# Patient Record
Sex: MALE | Race: BLACK OR AFRICAN AMERICAN | NOT HISPANIC OR LATINO | Employment: FULL TIME | URBAN - METROPOLITAN AREA
[De-identification: names, ages, dates, MRNs, and addresses within clinical notes are randomized per-mention and may not be internally consistent; named-entity substitution may affect disease eponyms.]

---

## 2017-05-26 ENCOUNTER — OFFICE VISIT (OUTPATIENT)
Dept: BARIATRICS/WEIGHT MGMT | Facility: CLINIC | Age: 37
End: 2017-05-26

## 2017-05-26 VITALS
HEART RATE: 68 BPM | HEIGHT: 70 IN | TEMPERATURE: 98.9 F | WEIGHT: 245 LBS | SYSTOLIC BLOOD PRESSURE: 164 MMHG | BODY MASS INDEX: 35.07 KG/M2 | RESPIRATION RATE: 18 BRPM | DIASTOLIC BLOOD PRESSURE: 97 MMHG

## 2017-05-26 DIAGNOSIS — IMO0001 REGURGITATION: ICD-10-CM

## 2017-05-26 DIAGNOSIS — E66.9 OBESITY, CLASS II, BMI 35-39.9: Primary | ICD-10-CM

## 2017-05-26 DIAGNOSIS — Z71.3 DIETARY COUNSELING: ICD-10-CM

## 2017-05-26 DIAGNOSIS — I10 ESSENTIAL HYPERTENSION: ICD-10-CM

## 2017-05-26 PROCEDURE — S2083 ADJUSTMENT GASTRIC BAND: HCPCS | Performed by: NURSE PRACTITIONER

## 2017-08-09 ENCOUNTER — OFFICE VISIT (OUTPATIENT)
Dept: BARIATRICS/WEIGHT MGMT | Facility: CLINIC | Age: 37
End: 2017-08-09

## 2017-08-09 VITALS
HEIGHT: 70 IN | SYSTOLIC BLOOD PRESSURE: 144 MMHG | WEIGHT: 245 LBS | HEART RATE: 83 BPM | RESPIRATION RATE: 18 BRPM | DIASTOLIC BLOOD PRESSURE: 84 MMHG | BODY MASS INDEX: 35.07 KG/M2 | TEMPERATURE: 98.5 F

## 2017-08-09 DIAGNOSIS — I10 ESSENTIAL HYPERTENSION: ICD-10-CM

## 2017-08-09 DIAGNOSIS — Z71.3 DIETARY COUNSELING: ICD-10-CM

## 2017-08-09 DIAGNOSIS — E66.9 OBESITY, CLASS II, BMI 35-39.9: Primary | ICD-10-CM

## 2017-08-09 DIAGNOSIS — E78.5 HYPERLIPIDEMIA, UNSPECIFIED HYPERLIPIDEMIA TYPE: ICD-10-CM

## 2017-08-09 DIAGNOSIS — M25.50 MULTIPLE JOINT PAIN: ICD-10-CM

## 2017-08-09 PROBLEM — IMO0001 REGURGITATION: Status: RESOLVED | Noted: 2017-05-26 | Resolved: 2017-08-09

## 2017-08-09 PROCEDURE — 99212 OFFICE O/P EST SF 10 MIN: CPT | Performed by: NURSE PRACTITIONER

## 2017-08-09 RX ORDER — VORTIOXETINE 10 MG/1
TABLET, FILM COATED ORAL
COMMUNITY
Start: 2017-08-04 | End: 2018-03-29

## 2017-08-09 RX ORDER — IRBESARTAN AND HYDROCHLOROTHIAZIDE 300; 12.5 MG/1; MG/1
TABLET, FILM COATED ORAL
COMMUNITY
Start: 2017-08-04

## 2017-08-09 NOTE — PROGRESS NOTES
[facility] BAR[facility]ATR[facility] BARIATRIC SURGERY  [address]  [facility] [address] [phone]  [address]  [facility] [address] [phone]  Dept: [phone]  8/9/2017      Rafiq Galvan.  [id number]  [id number]  1980  male      Chief Complaint   Patient presents with   • Follow-up     s/p lapband       BH Post-Op Bariatric Surgery:   Rafiq Galvan is status post Lapband procedure (APS), performed on 3/28/16    HPI:   Today's weight is 245 lb (111 kg) pounds, today's BMI is Body mass index is 35.15 kg/(m^2). and he has had no change in weight since the last visit. The patient reports experiencing hunger, but decreased hunger and loss of appetite.     Rafiq Galvan denies nausea, dysphagia or abdominal pain. The patientdoes not have vomiting or regurgitation. The patientdoes not have heartburn/reflux.    Patient doing well with his current level of restriction- not having any problems with eating solids and feels like he is full on the small plate portion.    Patient states their portion size is the small plate and their hunger is appropriate.    Diet and Exercise: Diet history reviewed and discussed with the patient. Weight loss/gains to date discussed with the patient. He reports eating 3 meals per day, a typical portion size of 1 cup, eating 2 snack per day, drinking 5 8-oz. glasses of water per day. The patient can tolerate solid protein.   The patient is eating protein first. The patient is limiting food volume. The patient is not taking vitamins. The patient is limiting snacking. The patient is regular exercise- running/walking 4-5 times a week. He is not drinking carbonated beverages.     The following portions of the patient's history were reviewed and updated as appropriate: allergies, current medications, past family history, past medical history, past social history, past surgical history and problem list.    Review of Systems   All other systems  reviewed and are negative.      Vitals:    08/09/17 1353   BP: 144/84   Pulse: 83   Resp: 18   Temp: 98.5 °F (36.9 °C)       Physical Exam   Constitutional: He is oriented to person, place, and time. He appears well-developed and well-nourished.   HENT:   Head: Normocephalic and atraumatic.   Eyes: EOM are normal.   Cardiovascular: Normal rate.    Pulmonary/Chest: Effort normal.   Musculoskeletal: Normal range of motion.   Neurological: He is alert and oriented to person, place, and time.   Skin: Skin is warm and dry.   Psychiatric: He has a normal mood and affect. His behavior is normal. Judgment and thought content normal.   Vitals reviewed.      Assessment: Post-operatively the patient is doing well.     Encounter Diagnoses   Name Primary?   • Obesity, Class II, BMI 35-39.9 Yes   • Dietary counseling    • Essential hypertension    • Hyperlipidemia, unspecified hyperlipidemia type    • Multiple joint pain        Plan:   Patient doing well with the current amount of restriction- not having any problems. Discussed appropriate dietary choices like cutting back on sugar. Continue with exercise.    No adjustment today as patient has ideal level of restriction. RTC for n/v/d/regurg. Encouraged patient to be sure to eat plenty of dense lean protein and high fiber foods like vegetables and fresh fruits while reducing simple carbohydrates. Reviewed the importance of eating solid foods vs. soft which will contribute to weight gain. Discussed the recommended amount of water to intake daily- half of body weight in ounces. Not drinking with or right after meals.    Activity restrictions: None.   Instructions / Recommendations: dietary counseling recommended, recommended a daily protein intake of  grams, patient was advised that the lap band system works best when consuming solid foods, vitamin supplement(s) recommended, recommended exercising at least 150 minutes per week inclduing both cardio and strength  training, behavior modifications recommended and instructed to call the office for concerns, questions, or problems.     The patient was instructed to follow up in 6-8 weeks      The patient was counseled regarding. Total time spent face to face was 12 minutes and 8 minutes was spent counseling.

## 2018-02-12 ENCOUNTER — OFFICE VISIT (OUTPATIENT)
Dept: BARIATRICS/WEIGHT MGMT | Facility: CLINIC | Age: 38
End: 2018-02-12

## 2018-02-12 VITALS
HEIGHT: 70 IN | RESPIRATION RATE: 16 BRPM | DIASTOLIC BLOOD PRESSURE: 95 MMHG | WEIGHT: 255 LBS | BODY MASS INDEX: 36.51 KG/M2 | SYSTOLIC BLOOD PRESSURE: 166 MMHG | HEART RATE: 85 BPM | TEMPERATURE: 98.4 F

## 2018-02-12 DIAGNOSIS — Z71.3 DIETARY COUNSELING: ICD-10-CM

## 2018-02-12 DIAGNOSIS — E66.9 OBESITY, CLASS II, BMI 35-39.9: Primary | ICD-10-CM

## 2018-02-12 DIAGNOSIS — M25.50 MULTIPLE JOINT PAIN: ICD-10-CM

## 2018-02-12 DIAGNOSIS — F41.8 DEPRESSION WITH ANXIETY: ICD-10-CM

## 2018-02-12 PROCEDURE — S2083 ADJUSTMENT GASTRIC BAND: HCPCS | Performed by: NURSE PRACTITIONER

## 2018-02-12 NOTE — PROGRESS NOTES
[facility]IATRIC [facility] BARIATRIC SURGERY  [address]  [facility] [address] [phone]  [address]  [facility] [address] [phone]  Dept: [phone]  2/12/2018      Rafiq Galvan.  [id number]  [id number]  1980  male      Chief Complaint   Patient presents with   • Follow-up     Followup AGB (4.5 mL)       BH Post-Op Bariatric Surgery:   Rafiq Galvan is status post Lapband procedure (APS), performed on 3/28/16.     HPI:   Today's weight is 116 kg (255 lb)  pounds, today's BMI is Body mass index is 36.59 kg/(m^2). and he has a gain of 10 pounds since the last visit. The patient reports a portion size larger than the small plate, increased hunger and denies a loss of appetite.     Rafiq Galvan denies nausea, dysphagia, or abdominal pain. The patientdoes have vomitng. The patientdoes have reflux.    Diet and Exercise: Diet history reviewed and discussed with the patient. Weight loss/gains to date discussed with the patient. He reports eating 3 meals per day, a typical portion size of greater than 1 cup, eating 2 snack per day, drinking 8 8-oz. glasses of water per day. The patient can tolerate solid protein.   The patient is eating protein first. The patient is limiting food volume. The patient is taking vitamins. The patient is limiting snacking. The patient is exercising regularly- bootcamp 4 days per week. He is drinking carbonated beverages.     The following portions of the patient's history were reviewed and updated as appropriate: allergies, current medications, past family history, past medical history, past social history, past surgical history and problem list.    Vitals:    02/12/18 1518   BP: 166/95   Pulse: 85   Resp: 16   Temp: 98.4 °F (36.9 °C)       Review of Systems   Constitutional: Positive for appetite change. Negative for fatigue and unexpected weight change.   HENT: Negative.    Eyes: Negative.    Respiratory: Negative.     Cardiovascular: Negative.  Negative for leg swelling.   Gastrointestinal: Negative for abdominal distention, abdominal pain, constipation, diarrhea, nausea and vomiting.   Genitourinary: Negative for difficulty urinating, frequency and urgency.   Musculoskeletal: Negative for back pain.   Skin: Negative.    Psychiatric/Behavioral: Negative.    All other systems reviewed and are negative.      Physical Exam   Constitutional: He appears well-developed and well-nourished.   Neck: No thyromegaly present.   Cardiovascular: Normal rate, regular rhythm and normal heart sounds.    Pulmonary/Chest: Effort normal and breath sounds normal. No respiratory distress. He has no wheezes.   Abdominal: Soft. Bowel sounds are normal. He exhibits no distension. There is no tenderness. There is no guarding. No hernia.   Musculoskeletal: He exhibits no edema or tenderness.   Neurological: He is alert.   Skin: Skin is warm and dry. No rash noted. No erythema.   Psychiatric: He has a normal mood and affect. His behavior is normal.   Nursing note and vitals reviewed.        Assessment: Post-operatively the patient is doing well    Encounter Diagnoses   Name Primary?   • Obesity, Class II, BMI 35-39.9 Yes   • Multiple joint pain    • Depression with anxiety    • Dietary counseling        Plan:     I think he would benefit from additional band restriction.  Under aseptic conditions, I accessed the port and 0.6mls of fluid were added for a total of 6.1mls.  He was able to tolerate a glass of water at the conclusion of the fill.  He was advised to consume soft solids for 24 hours before advancing back to a regular diet.  RTC for n/v/d/regurg.     We discussed his protein sources and that eating dense solid protein along with plenty of vegetables and fresh fruits is important for weight loss. Reviewed appropriate water intake- half of body weight in ounces and exercise routine- minimum of 150 minutes per with including both cardio and strength  training. Instructed not to drink with meals and wait 45 minutes after each meal before drinking.    He should follow-up in 4-6 weeks       Activity restrictions: None.   Instructions / Recommendations: dietary counseling recommended, recommended a daily protein intake of  grams, patient was advised that the lap band system works best when consuming solid foods, vitamin supplement(s) recommended, recommended exercising at least 150 minutes per week, behavior modifications recommended and instructed to call the office for concerns, questions, or problems.

## 2018-02-21 ENCOUNTER — HOSPITAL ENCOUNTER (OUTPATIENT)
Dept: GENERAL RADIOLOGY | Facility: HOSPITAL | Age: 38
Discharge: HOME | End: 2018-02-21
Admitting: FAMILY MEDICINE

## 2018-02-21 DIAGNOSIS — R52 PAIN: ICD-10-CM

## 2018-02-21 PROCEDURE — 73560 X-RAY EXAM OF KNEE 1 OR 2: CPT

## 2018-03-29 ENCOUNTER — OFFICE VISIT (OUTPATIENT)
Dept: BARIATRICS/WEIGHT MGMT | Facility: CLINIC | Age: 38
End: 2018-03-29

## 2018-03-29 VITALS
SYSTOLIC BLOOD PRESSURE: 156 MMHG | DIASTOLIC BLOOD PRESSURE: 96 MMHG | TEMPERATURE: 98.5 F | BODY MASS INDEX: 37.22 KG/M2 | RESPIRATION RATE: 18 BRPM | HEART RATE: 70 BPM | HEIGHT: 70 IN | WEIGHT: 260 LBS

## 2018-03-29 DIAGNOSIS — E78.5 HYPERLIPIDEMIA, UNSPECIFIED HYPERLIPIDEMIA TYPE: ICD-10-CM

## 2018-03-29 DIAGNOSIS — M25.50 MULTIPLE JOINT PAIN: ICD-10-CM

## 2018-03-29 DIAGNOSIS — Z72.4 INAPPROPRIATE DIET AND EATING HABITS: ICD-10-CM

## 2018-03-29 DIAGNOSIS — I10 ESSENTIAL HYPERTENSION: ICD-10-CM

## 2018-03-29 DIAGNOSIS — E66.9 OBESITY, CLASS II, BMI 35-39.9: Primary | ICD-10-CM

## 2018-03-29 DIAGNOSIS — Z71.3 DIETARY COUNSELING: ICD-10-CM

## 2018-03-29 DIAGNOSIS — R63.5 UNINTENDED WEIGHT GAIN: ICD-10-CM

## 2018-03-29 PROCEDURE — S2083 ADJUSTMENT GASTRIC BAND: HCPCS | Performed by: NURSE PRACTITIONER

## 2018-03-29 NOTE — PROGRESS NOTES
[facility] BARIATRIC [facility] BARIATRIC SURGERY  [address]  [facility] [address] [phone]  [address]  [facility] [address] [phone]  Dept: [phone]  3/29/2018      Rafiq Galvan.  [id number]  [id number]  1980  male      Chief Complaint   Patient presents with   • Follow-up     Followup AGB (6.1 mL)       BH Post-Op Bariatric Surgery:   Rafiq Galvan is status post Lapband procedure APS, performed on 3/28/16.     HPI:   Today's weight is 118 kg (260 lb)  pounds, today's BMI is Body mass index is 37.31 kg/m². and he has a gain of 5 pounds since the last visit. The patient reports a portion size larger than the small plate, increased hunger and denies a loss of appetite.     Rafiq Galvan denies nausea, dysphagia, or abdominal pain. The patientdoes not have vomitng. The patientdoes not have reflux.    Diet and Exercise: Diet history reviewed and discussed with the patient. Weight loss/gains to date discussed with the patient. He reports eating 3 meals per day, a typical portion size of greater than 1 cup, eating 3 snack per day, drinking 5 8-oz. glasses of water per day. The patient can tolerate solid protein.   The patient is eating protein first. The patient is not limiting food volume. The patient is not taking vitamins. The patient is limiting snacking. The patient is exercising regularly- going to [facility]. He is not drinking carbonated beverages.     The following portions of the patient's history were reviewed and updated as appropriate: allergies, current medications, past family history, past medical history, past social history, past surgical history and problem list.    Vitals:    03/29/18 1517   BP: 156/96   Pulse: 70   Resp: 18   Temp: 98.5 °F (36.9 °C)       Review of Systems   Endocrine: Positive for polyphagia.   All other systems reviewed and are negative.      Physical Exam   Constitutional: He is oriented to person, place, and time. He  appears well-developed and well-nourished.   HENT:   Head: Normocephalic and atraumatic.   Eyes: EOM are normal.   Cardiovascular: Normal rate.    Pulmonary/Chest: Effort normal.   Abdominal: Soft.   Musculoskeletal: Normal range of motion.   Neurological: He is alert and oriented to person, place, and time.   Skin: Skin is warm and dry.   Psychiatric: He has a normal mood and affect. His behavior is normal. Judgment and thought content normal.   Vitals reviewed.        Assessment: Post-operatively the patient is doing well but would benefit from additional restriction    Encounter Diagnoses   Name Primary?   • Obesity, Class II, BMI 35-39.9 Yes   • Dietary counseling    • Inappropriate diet and eating habits    • Unintended weight gain    • Essential hypertension    • Hyperlipidemia, unspecified hyperlipidemia type    • Multiple joint pain        Plan:     I think he would benefit from additional band restriction.  Under aseptic conditions, I accessed the port and 0.5mls of fluid were added for a total of 6.6mls.  He was able to tolerate a glass of water at the conclusion of the fill.  He was advised to consume soft solids for 24 hours before advancing back to a regular diet.  RTC for n/v/d/regurg.     We discussed his protein sources and that eating dense solid protein along with plenty of vegetables and fresh fruits is important for weight loss. Reviewed appropriate water intake- half of body weight in ounces and exercise routine- minimum of 150 minutes per with including both cardio and strength training. Instructed not to drink with meals and wait 45 minutes after each meal before drinking.    He should follow-up in 6 weeks       Activity restrictions: None.   Instructions / Recommendations: dietary counseling recommended, recommended a daily protein intake of  grams, patient was advised that the lap band system works best when consuming solid foods, vitamin supplement(s) recommended, recommended  exercising at least 150 minutes per week, behavior modifications recommended and instructed to call the office for concerns, questions, or problems.

## 2019-02-14 ENCOUNTER — OFFICE VISIT (OUTPATIENT)
Dept: BARIATRICS/WEIGHT MGMT | Facility: CLINIC | Age: 39
End: 2019-02-14

## 2019-02-14 VITALS
HEIGHT: 70 IN | DIASTOLIC BLOOD PRESSURE: 91 MMHG | SYSTOLIC BLOOD PRESSURE: 138 MMHG | WEIGHT: 256 LBS | TEMPERATURE: 98.6 F | RESPIRATION RATE: 18 BRPM | BODY MASS INDEX: 36.65 KG/M2 | HEART RATE: 75 BPM

## 2019-02-14 DIAGNOSIS — E66.9 OBESITY, CLASS II, BMI 35-39.9: Primary | ICD-10-CM

## 2019-02-14 DIAGNOSIS — R63.2 POLYPHAGIA: ICD-10-CM

## 2019-02-14 DIAGNOSIS — Z71.3 DIETARY COUNSELING: ICD-10-CM

## 2019-02-14 PROCEDURE — 99213 OFFICE O/P EST LOW 20 MIN: CPT | Performed by: NURSE PRACTITIONER

## 2019-02-14 NOTE — PROGRESS NOTES
[facility] BARIATRIC [facility] BARIATRIC SURGERY  [address]  [facility] [address] [phone]  [address]  [facility] [address] [phone]  Dept: [phone]  2/14/2019      Rafiq Galvan.  [id number]  [id number]  1980  male      Chief Complaint   Patient presents with   • Follow-up     band follow up       BH Post-Op Bariatric Surgery:   Rafiq Galvan is status post Lapband procedure APS, performed on 3/28/16.     HPI:   Today's weight is 116 kg (256 lb)  pounds, today's BMI is Body mass index is 36.73 kg/m². and he has a loss of 4 pounds since the last visit. The patient reports a portion size larger than the small plate, increased hunger and denies a loss of appetite.     Rafiq Galvan denies nausea, dysphagia, or abdominal pain. The patientdoes not have vomitng. The patientdoes not have reflux.    Diet and Exercise: Diet history reviewed and discussed with the patient. Weight loss/gains to date discussed with the patient. He reports eating 3 meals per day, a typical portion size of greater than 1 cup, eating 5 snack per day, drinking 3 8-oz. glasses of water per day. The patient can tolerate solid protein.   The patient is eating protein first. The patient is not limiting food volume. The patient is not taking vitamins. The patient is not limiting snacking. The patient is exercising regularly- gym/weights 5 times a week. He is not drinking carbonated beverages.     The following portions of the patient's history were reviewed and updated as appropriate: allergies, current medications, past family history, past medical history, past social history, past surgical history and problem list.    Vitals:    02/14/19 1204   BP: 138/91   Pulse: 75   Resp: 18   Temp: 98.6 °F (37 °C)       Review of Systems   Endocrine: Positive for polyphagia.   All other systems reviewed and are negative.      Physical Exam   Constitutional: He is oriented to person, place, and time.  He appears well-developed and well-nourished.   HENT:   Head: Normocephalic and atraumatic.   Eyes: EOM are normal.   Cardiovascular: Normal rate.   Pulmonary/Chest: Effort normal.   Abdominal: Soft.   Musculoskeletal: Normal range of motion.   Neurological: He is alert and oriented to person, place, and time.   Skin: Skin is warm and dry.   Psychiatric: He has a normal mood and affect. His behavior is normal. Judgment and thought content normal.   Vitals reviewed.      Assessment: Post-operatively the patient would benefit from additional restriction    Encounter Diagnoses   Name Primary?   • Obesity, Class II, BMI 35-39.9 Yes   • Dietary counseling    • Polyphagia        Plan:     I think he would benefit from additional band restriction.  Under aseptic conditions, I accessed the port and 0.6mls of fluid were added for a total of 6.2mls.  He was able to tolerate a glass of water at the conclusion of the fill.  He was advised to consume soft solids for 24 hours before advancing back to a regular diet.  RTC for n/v/d/regurg.     We discussed his protein sources and that eating dense solid protein along with plenty of vegetables and fresh fruits is important for weight loss. Reviewed appropriate water intake- half of body weight in ounces and exercise routine- minimum of 150 minutes per with including both cardio and strength training. Instructed not to drink with meals and wait 45 minutes after each meal before drinking.    He should follow-up in 6-8 weeks       Activity restrictions: None.   Instructions / Recommendations: dietary counseling recommended, recommended a daily protein intake of  grams, patient was advised that the lap band system works best when consuming solid foods, vitamin supplement(s) recommended, recommended exercising at least 150 minutes per week, behavior modifications recommended and instructed to call the office for concerns, questions, or problems.

## 2019-02-21 ENCOUNTER — OFFICE VISIT (OUTPATIENT)
Dept: BARIATRICS/WEIGHT MGMT | Facility: CLINIC | Age: 39
End: 2019-02-21

## 2019-02-21 VITALS
WEIGHT: 248 LBS | SYSTOLIC BLOOD PRESSURE: 140 MMHG | TEMPERATURE: 98.2 F | HEART RATE: 83 BPM | BODY MASS INDEX: 35.5 KG/M2 | DIASTOLIC BLOOD PRESSURE: 84 MMHG | HEIGHT: 70 IN | RESPIRATION RATE: 18 BRPM

## 2019-02-21 DIAGNOSIS — Z71.3 DIETARY COUNSELING: ICD-10-CM

## 2019-02-21 DIAGNOSIS — R11.10 REGURGITATION OF FOOD: ICD-10-CM

## 2019-02-21 DIAGNOSIS — E66.9 OBESITY, CLASS II, BMI 35-39.9: Primary | ICD-10-CM

## 2019-02-21 PROCEDURE — 99213 OFFICE O/P EST LOW 20 MIN: CPT | Performed by: NURSE PRACTITIONER

## 2019-02-21 NOTE — PROGRESS NOTES
[facility] BARIATRIC [facility] BARIATRIC SURGERY  [address]  [facility] [address] [phone]  [address]  [facility] [address] [phone]  Dept: [phone]  2/21/2019      Rafiq Galvan.  [id number]  [id number]  1980  male      Chief Complaint   Patient presents with   • Follow-up     band too tight       BH Post-Op Bariatric Surgery:   Rafiq Galvan is status post Lapband procedure APS, performed on 3/28/16.     HPI:   Today's weight is 112 kg (248 lb)  pounds, today's BMI is Body mass index is 35.58 kg/m². and he has a loss of 8 pounds since the last visit. The patient reports decreased hunger and  loss of appetite.     Patient admits to nausea and dysphagia. The patient denies abdominal pain. The patientdoes have vomitng. The patientdoes have reflux.    Diet and Exercise: Diet history reviewed and discussed with the patient. Weight loss/gains to date discussed with the patient. He reports eating 5 meals per day, a typical portion size of less than 1 cup, eating 5 snack per day, drinking 5 8-oz. glasses of water per day. The patient cannot tolerate solid protein.   The patient is not eating protein first. The patient is limiting food volume. The patient is not taking vitamins. The patient is limiting snacking. The patient is exercising regularly. He is not drinking carbonated beverages.     The following portions of the patient's history were reviewed and updated as appropriate: allergies, current medications, past family history, past medical history, past social history, past surgical history and problem list.    Vitals:    02/21/19 1107   BP: 140/84   Pulse: 83   Resp: 18   Temp: 98.2 °F (36.8 °C)       Review of Systems   Gastrointestinal: Positive for vomiting.   All other systems reviewed and are negative.      Physical Exam   Constitutional: He is oriented to person, place, and time. He appears well-developed and well-nourished.   HENT:   Head:  Normocephalic and atraumatic.   Eyes: EOM are normal.   Cardiovascular: Normal rate.   Pulmonary/Chest: Effort normal.   Abdominal: Soft.   Musculoskeletal: Normal range of motion.   Neurological: He is alert and oriented to person, place, and time.   Skin: Skin is warm and dry.   Psychiatric: He has a normal mood and affect. His behavior is normal. Judgment and thought content normal.   Vitals reviewed.      Assessment: Post-operatively the patient needs some fluid removed    Encounter Diagnoses   Name Primary?   • Obesity, Class II, BMI 35-39.9 Yes   • Dietary counseling    • Regurgitation of food        Plan:    My impression is Rafiq Galvan has too much restriction of his band.  I think he would benefit from fluid removal from his band.  Under aseptic conditions, I accessed the port and removed 0.3cc to bring the total band volume to 5.9cc.  He was able to tolerate a glass of water at the conclusion of the fill.  He was advised to consume warm liquids for today and then soft solids for 24 hours before advancing back to a regular diet.   RTC for n/v/d/regurg.     Reviewed the importance of being able to tolerate dense/solid protein and vegetables for weight loss. Discussed eating foods that are easier to tolerate, softer foods, usually contribute to weight gain because they are higher calorie/carb and will not keep patient full for the recommended 3-4 hours.      He should follow-up in 4-6 weeks.      UGI is symptoms persist    Activity restrictions: None.   Instructions / Recommendations: dietary counseling recommended, recommended a daily protein intake of  grams, patient was advised that the lap band system works best when consuming solid foods, vitamin supplement(s) recommended, recommended exercising at least 150 minutes per week, behavior modifications recommended and instructed to call the office for concerns, questions, or problems.

## 2019-08-15 ENCOUNTER — OFFICE VISIT (OUTPATIENT)
Dept: BARIATRICS/WEIGHT MGMT | Facility: CLINIC | Age: 39
End: 2019-08-15

## 2019-08-15 VITALS
BODY MASS INDEX: 37.51 KG/M2 | HEIGHT: 70 IN | DIASTOLIC BLOOD PRESSURE: 80 MMHG | RESPIRATION RATE: 18 BRPM | TEMPERATURE: 98.5 F | SYSTOLIC BLOOD PRESSURE: 137 MMHG | HEART RATE: 77 BPM | WEIGHT: 262 LBS

## 2019-08-15 DIAGNOSIS — Z71.3 DIETARY COUNSELING: ICD-10-CM

## 2019-08-15 DIAGNOSIS — E66.9 OBESITY, CLASS II, BMI 35-39.9: Primary | ICD-10-CM

## 2019-08-15 DIAGNOSIS — R63.2 POLYPHAGIA: ICD-10-CM

## 2019-08-15 PROCEDURE — 99213 OFFICE O/P EST LOW 20 MIN: CPT | Performed by: NURSE PRACTITIONER

## 2019-08-15 RX ORDER — LEVOCETIRIZINE DIHYDROCHLORIDE 5 MG/1
TABLET, FILM COATED ORAL
COMMUNITY
Start: 2019-08-01

## 2019-08-15 RX ORDER — MONTELUKAST SODIUM 10 MG/1
TABLET ORAL
COMMUNITY
Start: 2019-07-30

## 2019-08-15 RX ORDER — AMLODIPINE BESYLATE 10 MG/1
TABLET ORAL
COMMUNITY
Start: 2019-07-24

## 2019-08-15 RX ORDER — ATORVASTATIN CALCIUM 10 MG/1
TABLET, FILM COATED ORAL
COMMUNITY
Start: 2019-08-05

## 2019-08-15 RX ORDER — ESCITALOPRAM OXALATE 20 MG/1
TABLET ORAL DAILY
Refills: 0 | COMMUNITY
Start: 2019-07-26

## 2019-08-15 NOTE — PROGRESS NOTES
[facility] BARIATRIC SURGERY  [address] [address]  [phone]  [address] [id number] [phone]  Dept: [phone]  8/15/2019      Rafiq Galvan.  [id number]  [id number]  1980  male      Chief Complaint   Patient presents with   • Follow-up     Band       BH Post-Op Bariatric Surgery:   Rafiq Galvan is status post Lapband procedure APS, performed on 3/28/16.     HPI:   Today's weight is 119 kg (262 lb)  pounds, today's BMI is Body mass index is 37.59 kg/m². and he has a gain of 14 pounds since the last visit. The patient reports a portion size larger than the small plate, increased hunger and denies a loss of appetite.     Rafiq Galvan denies nausea, dysphagia, or abdominal pain. The patientdoes not have vomitng. The patientdoes not have reflux.    Diet and Exercise: Diet history reviewed and discussed with the patient. Weight loss/gains to date discussed with the patient. He reports eating 5 meals per day, a typical portion size of greater than 1 cup, eating 5 snack per day, drinking 4 8-oz. glasses of water per day. The patient can tolerate solid protein.   The patient is eating protein first. The patient is not limiting food volume. The patient is not taking vitamins. The patient is not limiting snacking. The patient is exercising regularly. He is not drinking carbonated beverages.     The following portions of the patient's history were reviewed and updated as appropriate: allergies, current medications, past family history, past medical history, past social history, past surgical history and problem list.    Vitals:    08/15/19 1353   BP: 137/80   Pulse: 77   Resp: 18   Temp: 98.5 °F (36.9 °C)       Review of Systems   Constitutional: Positive for unexpected weight change.   Endocrine: Positive for polyphagia.   All other systems reviewed and are negative.      Physical Exam   Constitutional: He is oriented to  person, place, and time. He appears well-developed and well-nourished.   HENT:   Head: Normocephalic and atraumatic.   Eyes: EOM are normal.   Cardiovascular: Normal rate.   Pulmonary/Chest: Effort normal.   Abdominal: Soft.   Musculoskeletal: Normal range of motion.   Neurological: He is alert and oriented to person, place, and time.   Skin: Skin is warm and dry.   Psychiatric: He has a normal mood and affect. His behavior is normal. Judgment and thought content normal.   Vitals reviewed.      Assessment: Post-operatively the patient needs an adjustment    Encounter Diagnoses   Name Primary?   • Obesity, Class II, BMI 35-39.9 Yes   • Dietary counseling    • Polyphagia        Plan:     I think he would benefit from additional band restriction.  Under aseptic conditions, I accessed the port and 0.3mls of fluid were added for a total of 6.2mls.  He was able to tolerate a glass of water at the conclusion of the fill.  He was advised to consume soft solids for 24 hours before advancing back to a regular diet.  RTC for n/v/d/regurg.     We discussed his protein sources and that eating dense solid protein along with plenty of vegetables and fresh fruits is important for weight loss. Reviewed appropriate water intake- half of body weight in ounces and exercise routine- minimum of 150 minutes per with including both cardio and strength training. Instructed not to drink with meals and wait 45 minutes after each meal before drinking.    He should follow-up in 6 weeks       Activity restrictions: None.   Instructions / Recommendations: dietary counseling recommended, recommended a daily protein intake of  grams, patient was advised that the lap band system works best when consuming solid foods, vitamin supplement(s) recommended, recommended exercising at least 150 minutes per week, behavior modifications recommended and instructed to call the office for concerns, questions, or problems.

## 2022-09-01 ENCOUNTER — OFFICE VISIT (OUTPATIENT)
Dept: BARIATRICS/WEIGHT MGMT | Facility: CLINIC | Age: 42
End: 2022-09-01

## 2022-09-01 VITALS
OXYGEN SATURATION: 99 % | BODY MASS INDEX: 33.21 KG/M2 | SYSTOLIC BLOOD PRESSURE: 151 MMHG | TEMPERATURE: 98.4 F | HEIGHT: 70 IN | HEART RATE: 87 BPM | WEIGHT: 232 LBS | DIASTOLIC BLOOD PRESSURE: 98 MMHG

## 2022-09-01 DIAGNOSIS — R11.10 REGURGITATION OF FOOD: ICD-10-CM

## 2022-09-01 DIAGNOSIS — Z46.51 FITTING AND ADJUSTMENT OF GASTRIC LAP BAND: Primary | ICD-10-CM

## 2022-09-01 DIAGNOSIS — Z71.3 DIETARY COUNSELING: ICD-10-CM

## 2022-09-01 PROCEDURE — 99203 OFFICE O/P NEW LOW 30 MIN: CPT | Performed by: NURSE PRACTITIONER

## 2022-09-01 RX ORDER — BUPROPION HYDROCHLORIDE 150 MG/1
TABLET ORAL
COMMUNITY
Start: 2022-08-26

## 2022-09-01 NOTE — PROGRESS NOTES
[facility] BARIATRIC SURGERY  [address] [facility] [address]  [facility] [id number] [phone]  [address] [address] [phone]  Dept: [phone]  9/1/2022      Rafiq Galvan.  [id number]  [id number]  1980  male      No chief complaint on file.       Post-Op Bariatric Surgery:   Rafiq Galvan is status post Lapband procedure APS, performed on 3/28/2016.     HPI:   Today's weight is 105 kg (232 lb)  pounds, today's BMI is Body mass index is 33.29 kg/m². and@ has a loss of 30 pounds since the last visit in 2019. The patient reports decreased hunger and  loss of appetite.     Patient admits to nausea and dysphagia. The patient denies abdominal pain. The patientdoes have vomitng. The patientdoes not have reflux.  States that he hasn't been able to tolerate solid fruits and veggies for several months.  Has been blending them in order to eat and even then it takes a while.  Has been drinking protein shakes to make sure gets protein in but would rather be eating high protein foods.      Diet and Exercise: Diet history reviewed and discussed with the patient. Weight loss/gains to date discussed with the patient. He reports eating 2-3 meals per day, a typical portion size of 1/2 cup, eating 1 snack per day, drinking 6 8-oz. glasses of water per day. The patient cannot tolerate solid protein.   The patient is trying as able eating protein first. The patient is limiting food volume. The patient is taking vitamins. The patient is limiting snacking. The patient is exercising regularly. He is not drinking carbonated beverages.     The following portions of the patient's history were reviewed and updated as appropriate: allergies, current medications, past medical history, past surgical history and problem list.    Vitals:    09/01/22 1432   BP: 151/98   Pulse: 87   Temp: 98.4 °F (36.9 °C)   SpO2: 99%       Review of Systems   Constitutional: Positive for  appetite change.   Gastrointestinal: Positive for vomiting.   All other systems reviewed and are negative.      Physical Exam  Vitals reviewed.   Constitutional:       General: He is not in acute distress.     Appearance: Normal appearance. He is obese.   HENT:      Head: Normocephalic and atraumatic.      Mouth/Throat:      Mouth: Mucous membranes are moist.      Pharynx: Oropharynx is clear.   Eyes:      General: No scleral icterus.     Extraocular Movements: Extraocular movements intact.      Conjunctiva/sclera: Conjunctivae normal.      Pupils: Pupils are equal, round, and reactive to light.   Cardiovascular:      Rate and Rhythm: Normal rate and regular rhythm.      Heart sounds: Normal heart sounds. No murmur heard.    No gallop.   Pulmonary:      Effort: Pulmonary effort is normal. No respiratory distress.   Abdominal:      General: Bowel sounds are normal.      Palpations: Abdomen is soft.   Musculoskeletal:         General: Normal range of motion.      Cervical back: Normal range of motion and neck supple.   Skin:     General: Skin is warm and dry.   Neurological:      General: No focal deficit present.      Mental Status: He is alert and oriented to person, place, and time.   Psychiatric:         Mood and Affect: Mood normal.         Behavior: Behavior normal.         Thought Content: Thought content normal.         Judgment: Judgment normal.         Assessment: Post-operatively the patient has developed dysphagia, vomiting, and inability to tolerate solid protein, fruits, and veggies.  Wishes to have fluid removed.      Encounter Diagnoses   Name Primary?   • Fitting and adjustment of gastric lap band Yes   • Regurgitation of food    • Dietary counseling        Plan:    My impression is Rafiq Galvan has too much restriction of his band.  I think he would benefit from fluid removal from his band.  Under aseptic conditions, I accessed the port and removed 6.2 cc to bring the total band volume to 0cc.  He was  able to tolerate a glass of water at the conclusion of the fill.  He was advised to consume warm liquids for today and then soft solids for 24 hours before advancing back to a regular diet.   RTC for n/v/d/regurg.     Reviewed the importance of being able to tolerate dense/solid protein and vegetables for weight loss. Discussed eating foods that are easier to tolerate, softer foods, usually contribute to weight gain because they are higher calorie/carb and will not keep patient full for the recommended 3-4 hours.      He should follow-up in prn.      UGI will be ordered if symptoms do not resolve over the next few days.    Activity restrictions: None.   Instructions / Recommendations: dietary counseling recommended, recommended a daily protein intake of  grams, patient was advised that the lap band system works best when consuming solid foods, vitamin supplement(s) recommended, recommended exercising at least 150 minutes per week, behavior modifications recommended and instructed to call the office for concerns, questions, or problems.     Total time adjusting the band and time with the patient was approximately 35 minutes.  Chart was also reviewed prior to starting the visit. The patient was counseled regarding the LAP-BAND and how the band works.

## 2023-12-06 ENCOUNTER — APPOINTMENT (OUTPATIENT)
Dept: ULTRASOUND IMAGING | Facility: HOSPITAL | Age: 43
End: 2023-12-06
Payer: OTHER GOVERNMENT

## 2023-12-06 ENCOUNTER — HOSPITAL ENCOUNTER (EMERGENCY)
Facility: HOSPITAL | Age: 43
Discharge: HOME | End: 2023-12-06
Attending: EMERGENCY MEDICINE
Payer: OTHER GOVERNMENT

## 2023-12-06 VITALS
HEIGHT: 70 IN | WEIGHT: 232 LBS | SYSTOLIC BLOOD PRESSURE: 157 MMHG | TEMPERATURE: 99.3 F | OXYGEN SATURATION: 98 % | HEART RATE: 119 BPM | RESPIRATION RATE: 16 BRPM | DIASTOLIC BLOOD PRESSURE: 90 MMHG | BODY MASS INDEX: 33.21 KG/M2

## 2023-12-06 DIAGNOSIS — N45.1 LEFT EPIDIDYMITIS: Primary | ICD-10-CM

## 2023-12-06 LAB
BACTERIA UR QL AUTO: ABNORMAL /HPF
BILIRUB UR QL STRIP: NEGATIVE
CLARITY UR: CLEAR
COLOR UR: YELLOW
GLUCOSE UR STRIP-MCNC: NEGATIVE MG/DL
HGB UR QL STRIP.AUTO: NEGATIVE
HOLD SPECIMEN: NORMAL
HYALINE CASTS UR QL AUTO: ABNORMAL /LPF
KETONES UR QL STRIP: NEGATIVE
LEUKOCYTE ESTERASE UR QL STRIP.AUTO: ABNORMAL
NITRITE UR QL STRIP: NEGATIVE
PH UR STRIP.AUTO: 6 [PH] (ref 5–8)
PROT UR QL STRIP: NEGATIVE
RBC # UR STRIP: ABNORMAL /HPF
REF LAB TEST METHOD: ABNORMAL
SP GR UR STRIP: 1.01 (ref 1–1.03)
SQUAMOUS #/AREA URNS HPF: ABNORMAL /HPF
UROBILINOGEN UR QL STRIP: ABNORMAL
WBC # UR STRIP: ABNORMAL /HPF

## 2023-12-06 PROCEDURE — 81001 URINALYSIS AUTO W/SCOPE: CPT | Performed by: EMERGENCY MEDICINE

## 2023-12-06 PROCEDURE — 25010000002 CEFTRIAXONE PER 250 MG: Performed by: EMERGENCY MEDICINE

## 2023-12-06 PROCEDURE — 93976 VASCULAR STUDY: CPT

## 2023-12-06 PROCEDURE — 87591 N.GONORRHOEAE DNA AMP PROB: CPT | Performed by: EMERGENCY MEDICINE

## 2023-12-06 PROCEDURE — 96372 THER/PROPH/DIAG INJ SC/IM: CPT

## 2023-12-06 PROCEDURE — 87491 CHLMYD TRACH DNA AMP PROBE: CPT | Performed by: EMERGENCY MEDICINE

## 2023-12-06 PROCEDURE — 99284 EMERGENCY DEPT VISIT MOD MDM: CPT

## 2023-12-06 PROCEDURE — 87661 TRICHOMONAS VAGINALIS AMPLIF: CPT | Performed by: EMERGENCY MEDICINE

## 2023-12-06 PROCEDURE — 99284 EMERGENCY DEPT VISIT MOD MDM: CPT | Performed by: EMERGENCY MEDICINE

## 2023-12-06 PROCEDURE — 76870 US EXAM SCROTUM: CPT

## 2023-12-06 RX ORDER — DOXYCYCLINE 100 MG/1
100 CAPSULE ORAL ONCE
Status: COMPLETED | OUTPATIENT
Start: 2023-12-06 | End: 2023-12-06

## 2023-12-06 RX ORDER — DOXYCYCLINE 100 MG/1
100 CAPSULE ORAL 2 TIMES DAILY
Qty: 28 CAPSULE | Refills: 0 | Status: SHIPPED | OUTPATIENT
Start: 2023-12-06 | End: 2023-12-20

## 2023-12-06 RX ADMIN — CEFTRIAXONE SODIUM 500 MG: 1 INJECTION, POWDER, FOR SOLUTION INTRAMUSCULAR; INTRAVENOUS at 21:32

## 2023-12-06 RX ADMIN — DOXYCYCLINE 100 MG: 100 CAPSULE ORAL at 21:33

## 2023-12-06 NOTE — Clinical Note
[facility]  [address]  [facility] [address]    Rafiq Galvan was seen and treated in our emergency department on 12/6/2023.  He may return to work on 12/07/2023.         Thank you for choosing [facility].    Desirae Gilmore MD

## 2023-12-07 NOTE — FSED PROVIDER NOTE
Subjective   History of Present Illness  43-year-old gentleman with a history of 3 weeks of left testicular pain and swelling.  He has never had this before.  He says the swelling has become a little better but he has added pain up into his left groin.  No known fever, no penile discharge, no lesions of the groin, no blood in his urine or colored urine.  No known fever.  He has noticed the swelling on the left side which is bigger than usual and tender.  He is .  He does not suspect STDs.  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  Does not hurt or burn or irritate when he urinates.      Review of Systems    Past Medical History:   Diagnosis Date    Anxiety     Fatigue     Hyperlipidemia     Hypertension     Joint pain        No Known Allergies    Past Surgical History:   Procedure Laterality Date    CYSTOSCOPY      GASTRIC BANDING N/A 3/28/2016    Procedure: GASTRIC BANDING LAPAROSCOPIC WITH HIATAL HERNIA REPAIR;  Surgeon: Luis A Penn Jr., MD;  Location: [facility] OR [facility];  Service:     TONSILLECTOMY         Family History   Problem Relation Age of Onset    Obesity Mother     Hypertension Mother     Hypertension Father     Obesity Maternal Grandmother     Cancer Maternal Grandmother     Heart attack Maternal Grandfather     Obesity Paternal Grandmother        Social History     Socioeconomic History    Marital status: Single     Spouse name: Joby    Number of children: 1   Tobacco Use    Smoking status: Former     Packs/day: 1.00     Years: 15.00     Additional pack years: 0.00     Total pack years: 15.00     Types: Cigarettes     Quit date: 2016     Years since quittin.8    Smokeless tobacco: Never   Substance and Sexual Activity    Alcohol use: Yes     Comment: SOCIAL    Drug use: No           Objective   Physical Exam  Vitals and nursing note reviewed.    Constitutional:       Appearance: Normal appearance.   HENT:      Mouth/Throat:      Mouth: Mucous membranes are moist.      Pharynx: Oropharynx is clear.   Eyes:      Extraocular Movements: Extraocular movements intact.   Cardiovascular:      Rate and Rhythm: Normal rate.   Pulmonary:      Effort: Pulmonary effort is normal.   Abdominal:      Palpations: Abdomen is soft.   Genitourinary:     Penis: Normal.       Comments: Right testicular exam within normal limits no mass or swelling or tenderness.  No lesions no heat no adnexal  LAD, penis exam normal no discharge no redness no swelling no lesions.  Left testicle is large tender posteriorly especially with cords.  It is larger than the right.  Tender groin left side with small LAD.  Musculoskeletal:      Cervical back: Neck supple.   Skin:     General: Skin is warm and dry.      Capillary Refill: Capillary refill takes less than 2 seconds.   Neurological:      General: No focal deficit present.      Mental Status: He is alert and oriented to person, place, and time. Mental status is at baseline.   Psychiatric:         Mood and Affect: Mood normal.         Behavior: Behavior normal.         Thought Content: Thought content normal.         Judgment: Judgment normal.         Procedures           ED Course  ED Course as of 12/07/23 0710   Wed Dec 06, 2023   1951 Urine shows small leukocytes otherwise clean.  Ultrasounds pending and expect the signout to the overnight doctor Dr. Mei. [AR]   2111 Ultrasound shows epididymitis.  Patient will be treated with Rocephin and doxycycline. [KZ]      ED Course User Index  [AR] Desirae Gilmore MD  [KZ] Sandoval Mei MD                                           Medical Decision Making  Differential diagnosis includes but not limited to an unlikely torsion as it has been 3 weeks, and he has cords on the back of his testicle with heat and swelling.  Infection is more likely although to STDs are low on the list and more  likely of prostate related infection.  Prostate exam declined.  Will get a urine test and ultrasound testicular 2 ultrasounds here in the ER.    Problems Addressed:  Left epididymitis: complicated acute illness or injury    Amount and/or Complexity of Data Reviewed  Radiology: ordered.        Final diagnoses:   Left epididymitis       ED Disposition  ED Disposition       ED Disposition   Discharge    Condition   Stable    Comment   --               Reyes, Miriam Mercado, MD  [address] [address]  [phone]    Schedule an appointment as soon as possible for a visit in 2 weeks  For repeat evaluation         Medication List        New Prescriptions      doxycycline 100 MG capsule  Commonly known as: MONODOX  Take 1 capsule by mouth 2 (Two) Times a Day for 14 days.               Where to Get Your Medications        These medications were sent to [facility]/pharmacy #[id number] - [place], [place] - [address] - [phone]  - [phone]  [address] [id number]      Phone: [phone]   doxycycline 100 MG capsule

## 2023-12-07 NOTE — DISCHARGE INSTRUCTIONS
Take antibiotics as prescribed, complete the entire course.  Please follow-up with primary care physician for repeat evaluation after the completion of antibiotic therapy.  Over-the-counter Tylenol and/or ibuprofen as needed for pain.  Return to the emergency department for any concerns.  No sexual activity for 2 weeks.

## 2023-12-08 LAB
C TRACH RRNA SPEC QL NAA+PROBE: NEGATIVE
N GONORRHOEA RRNA SPEC QL NAA+PROBE: NEGATIVE
T VAGINALIS RRNA SPEC QL NAA+PROBE: NEGATIVE

## 2024-04-18 ENCOUNTER — LAB REQUISITION (OUTPATIENT)
Dept: LAB | Facility: HOSPITAL | Age: 44
End: 2024-04-18
Payer: OTHER GOVERNMENT

## 2024-04-18 DIAGNOSIS — N35.812 OTHER BULBOUS URETHRAL STRICTURE, MALE: ICD-10-CM

## 2024-04-18 PROCEDURE — 88305 TISSUE EXAM BY PATHOLOGIST: CPT | Performed by: UROLOGY

## 2024-04-19 LAB
LAB AP CASE REPORT: NORMAL
PATH REPORT.FINAL DX SPEC: NORMAL
PATH REPORT.GROSS SPEC: NORMAL